# Patient Record
Sex: MALE | Race: WHITE | Employment: FULL TIME | URBAN - METROPOLITAN AREA
[De-identification: names, ages, dates, MRNs, and addresses within clinical notes are randomized per-mention and may not be internally consistent; named-entity substitution may affect disease eponyms.]

---

## 2018-03-19 ENCOUNTER — APPOINTMENT (OUTPATIENT)
Dept: PHYSICAL THERAPY | Age: 38
End: 2018-03-19
Payer: COMMERCIAL

## 2018-03-23 ENCOUNTER — HOSPITAL ENCOUNTER (OUTPATIENT)
Dept: PHYSICAL THERAPY | Age: 38
Discharge: HOME | End: 2018-03-23
Payer: COMMERCIAL

## 2018-03-23 PROCEDURE — 97161 PT EVAL LOW COMPLEX 20 MIN: CPT

## 2018-03-23 PROCEDURE — 97530 THERAPEUTIC ACTIVITIES: CPT

## 2018-03-23 PROCEDURE — 97110 THERAPEUTIC EXERCISES: CPT

## 2018-03-23 NOTE — PROGRESS NOTES
PHYSICAL THERAPY - DAILY TREATMENT NOTE    Patient Name: Christine Pompa        Date: 3/23/2018  : 1980   YES Patient  Verified  Visit #:   1   of   2  Insurance: Payor: [employer] / Plan: [employer] H[employer] / Product Type: HMO /      In time: 9:00 Out time: 10:00   Total Treatment Time: 60     Medicare Time Tracking (below)   Total Timed Codes (min):  n/a 1:1 Treatment Time:  n/a     TREATMENT AREA =  RA    SUBJECTIVE/HISTORY    Pain Level (on 0 to 10 scale):  0-1  / 10   Medication Changes/New allergies or changes in medical history, any new surgeries or procedures? YES    If yes, update Summary List   Subjective Functional Status/Changes:  []  No changes reported     CC:8months ago since dx'd with Serogenative RA. Had previously been very active. Running, Rowing. Has been doing strength training. Now feels oversensitive in ligaments. Ice/Rest ineffective, wants coping strategies to address inflammatory processes. Rowing 5-6x/week, Yoga 1/week. Strength training 2-3xweek. Front Squat/Row ([employer]), static core work, OHP. Full body circuit, 2-3' rest between circuits. Reports focus on DB work for all compound lifts. Hx L4-5 HNP. Reports 1 day of recovery, 6 days \"hard\". General Health:  Red Flags Indicated? [] Yes    [x] No  [] Yes [] No Recent weight change (If yes, due to dieting?  [] Yes  [] No)   [] Yes [] No Persistent cough  [] Yes [] No Unremitting pain at night  [] Yes [] No Dizziness  [] Yes [] No Blurred vision  [] Yes [] No Hands more cold or painful in cold weather  [] Yes [] No Ringing in ears  [] Yes [] No Difficulty swallowing  [] Yes [] No Dysfunction of bowel or bladder  [] Yes [] No Recent illness within past 3 weeks (i.e, cold, flu)  [] Yes [] No Jaw pain     Diagnostic Tests: [x] Lab work [x] X-rays    [] CT [] MRI     [] Other:  Results:seronegative Rheumatoid Arthritis     OBJECTIVE      25(25) min Therapeutic Exercise:  [x]  See flow sheet Rationale:      increase strength to improve the patients proximal joint stability     10(10) min Therapeutic Activity: Posture, positioning, tranfsers, hip hinge, squatting techniques   Rationale:    improve coordination, improve balance and increase proprioception to improve patient's safety in recreational activities. min Patient Education:  YES  Reviewed HEP   []  Progressed/Changed HEP based on: Other Objective/Functional Measures:    Discussed with patient at length his recreational activity, which is 6 days on/ 1 day recovery. Educated patient at length re: recovery days, as well as balancing his strength routine to incorporate posterior chain work and RTC work. .     Neurological (upper): Motor deficit: Left ER 4-/5  Reflexes: WNL  Sensory deficit: WNL  Dural signs:  WNL    Movement loss (Cervical) Mauricio Mod Min Nil Pain   Protrusion    x n/a   Flexion    x n/a   Retraction    x n/a   Extension    x n/an   Lateral Flexion R    x n/a   Lateral Flexion L    x n/a   Rotation R    x n/a   Rotation L    x n/a         Movement loss  (Lumbar) Mauricio Mod Min Nil Pain   Flexion     x   Extension     x   Side Gliding R     x   Side Gliding L     x          Hip: Alphia Hidden:  [] R    [] L    [] +    [x] -     Scour:  [] R    [] L    [] +    [x] -     Piriformis: [] R    [] L    [] +    [x] -          Deficits: Hamstrings 90/90[de-identified] [] R    [] L    [] +    [x] -     Tigre: [] R    [] L    [] +    [x] -         Gastrocsoleus (to neutral): Right: Left:      ROM:  [] Unable to assess at this time                                           AROM                                                              PROM   Left Right  Left Right   Flexion WNL WNL Flexion     Extension WNL WNL Extension     Scaption/ABD WNL WNL Scaption/ABD     ER @ 0 Degrees WNL WNL ER @ 0 Degrees     ER @ 90 Degrees WNL WNL ER @ 90 Degrees     IR @ 90 Degrees WNL WNL IR @ 90 Degrees     IR HBB WNL WNL IR HBB       End Feel / Painful Arc: none        Strength:   [] Unable to assess at this time                                                                            L (1-5) R (1-5) Pain   Flexors 5 5 [] Yes   [] No   Abductors 5 5 [] Yes   [] No   External Rotators 3+/4- 5 [] Yes   [] No   Internal Rotators 5 5 [] Yes   [] No   Supraspinatus 5 5 [] Yes   [] No   Serratus Anterior 5 5 [] Yes   [] No   Lower Trapezius 4 5 [] Yes   [] No   Elbow Flexion 5 5 [] Yes   [] No   Elbow Extension 5 5 [] Yes   [] No       Scapulohumoral Control / Rhythm:good    Accessory Motions: none    Able to eccentrically lower with good control? Optional Tests:    Sensation Left Right Reflexes Left Right   Biceps (C5)   Biceps (C5)     Vang Radial(C6-7)   Brachioradialis (C6)     Vang Ulnar(C8-T1)   Triceps (C7)       Crossover Imp.  [x] Pos   [] Neg  Yergason's Test [] Pos   [] Neg  Damaris's Test  [] Pos   [] Neg  Raheem's Sign [] Pos   [] Neg  Neer's Test  [] Pos   [] Neg  Clunk Test  [] Pos   [] Neg  Hawkin's Test  [x] Pos   [] Neg  AC Joint  [] Pos   [] Neg  Speed's Test  [] Pos   [] Neg SC Joint  [] Pos   [] Neg  Empty Can  [] Pos   [] Neg Pectoral Tightness [] Pos   [] Neg  Anterior Apprehension [] Pos   [] Neg   Posterior Apprehension [] Pos   [] Neg      ROM / Strength  [] Unable to assess                  AROM                      PROM                   Strength (1-5)    Left Right Left Right Left Right   Hip Flexion WNL WNL   4 4    Extension WNL WNL   4- 4-    Abduction WNL WNL   4- 4-    Adduction WNL WNL   5 5   Knee Flexion WNL WNL   5 5    Extension WNL WNL   5 5   Ankle Plantarflexion WNL WNL   5 5    Dorsiflexion WNL WNL   5 5   (+) Tigre Test bilaterally     Post Treatment Pain Level (on 0 to 10) scale:   0-1  / 10     ASSESSMENT    Assessment/Changes in Function:     See POC     []  See Progress Note/Recertification   Patient will continue to benefit from skilled PT services to  modify and progress therapeutic interventions, address functional mobility deficits, address strength deficits, analyze and address soft tissue restrictions and analyze and modify body mechanics/ergonomics to attain remaining goals. Progress toward goals / Updated goals:    See POC     PLAN     []  Upgrade activities as tolerated YES Continue plan of care   []  Discharge due to :    []  Other:      Therapist: Mell Kilgore \"MINERVA\" OSWALDO Haynes, Cert. MDT, Cert. DN, Cert. SMT    Date: 3/23/2018 Time: 9:09 AM   No future appointments.

## 2018-03-27 NOTE — PROGRESS NOTES
[address] [facility] PHYSICAL THERAPY  [address] [facility] [place]  [place], [address] - Phone: [phone]  Fax: [phone] / [address] [facility]  Patient Name: Michelle Alanis : 1980   Medical   Diagnosis: Generalized muscle weakness [M62.81]  Rheumatoid arthritis ([address] [M06.9] Treatment Diagnosis: Generalized Muscle Weakness, RA   Onset Date: Summer 2017     Referral Source: Shila Prieto MD [facility]): 3/27/2018   Prior Hospitalization: See medical history Provider #: [id number]   Prior Level of Function: Recreationally active    Comorbidities: Thyroid dysfunction    Medications: Verified on Patient Summary List   The Plan of Care and following information is based on the information from the initial evaluation.   ===========================================================================================  Assessment / key information: Patient is a 41 y.o. male presenting to clinic for generalized weakness due to RA. He reports performing recreational rowing 6x/week with various teams, strength training 3x/week, and yoga/recovery 1x/week. He reports intent to attend 1-2 PT sessions to \"get a general idea of exercises needed to help out\". Patient demonstrates poor proximal hip stability/strength, and Left shoulder girdle ER is notably weak with 3/5 MMT. He reports hx of L RTC problems, confirmed with (+) Crossover impingement and Ferrer-Cale tests. We discussed at length his current recreational activity routine, and the importance of recovery days. We also reviewed his exercises performed on his strength days, and provided him with therex to address his muscular imbalances. Patient performed and reviewed HEP to be performed over the next 2 weeks, with intent to return for further re-assessment and possible discharge. ===========================================================================================  Eval Complexity: History: MEDIUM  Complexity : 1-2 comorbidities / personal factors will impact the outcome/ POC Exam:HIGH Complexity : 4+ Standardized tests and measures addressing body structure, function, activity limitation and / or participation in recreation  Presentation: LOW Complexity : Stable, uncomplicated  Clinical Decision Making:LOW Complexity : FOTO score of 75-100Overall Complexity:LOW   Problem List: decrease strength   Treatment Plan may include any combination of the following: Therapeutic exercise, Therapeutic activities, Patient education and Functional mobility training  Patient / Family readiness to learn indicated by: asking questions, trying to perform skills and interest  Persons(s) to be included in education: patient (P)  Barriers to Learning/Limitations: None  Measures taken: n/a   Patient Goal (s): \"Learn some exercises to incorporate with what I'm doing\"   Patient self reported health status: excellent  Rehabilitation Potential: excellent   Short Term Goals: To be accomplished in  1  treatments:  1. Patient to be adherent to HEP to facilitate pain control with ADL's.  Long Term Goals: To be accomplished in  2  treatments:  1. Patient to be Safe and Independent with HEP to self-manage/prevent symptoms after DC. Frequency / Duration:   Patient to be seen  1  times per week for 1-2  treatments:  Patient / Caregiver education and instruction: activity modification and exercises. We reviewed our facility's Patient Personal Responsibilities (PPR) form, particularly in regards to compliance towards his appointment time, our attendance policy, and his home exercise program. Patient was informed of possible discharge for non-compliance to our attendance policy per PPR form. We also discussed his POC as deemed appropriate by the treating therapist and physician.  Patient verbalized understanding that he must show objective and functional improvement in an appropriate time frame. Patient verbalized understanding that should progress or compliance be lacking, we will contact the referring physician for further consultation to address and attempt to establish alternate treatment strategies as necessary and/or possibly discharge. G-Codes (GP): n/a  Therapist Signature: Sukhjinder Lopez \"BJ\" OSWALDO Haynes, Cert. MDT, Cert. DN, Cert. SMT Date: 6/52/5911   Certification Period: n/a Time: 11:11 AM   ===========================================================================================  I certify that the above Physical Therapy Services are being furnished while the patient is under my care. I agree with the treatment plan and certify that this therapy is necessary. Physician Signature:        Date:       Time:     Please sign and return to [employer] or you may fax the signed copy to [phone]. Thank you.

## 2018-04-13 ENCOUNTER — HOSPITAL ENCOUNTER (OUTPATIENT)
Dept: PHYSICAL THERAPY | Age: 38
Discharge: HOME | End: 2018-04-13
Payer: COMMERCIAL

## 2018-04-13 PROCEDURE — 97110 THERAPEUTIC EXERCISES: CPT

## 2018-04-13 PROCEDURE — 97530 THERAPEUTIC ACTIVITIES: CPT

## 2018-04-13 NOTE — PROGRESS NOTES
PHYSICAL THERAPY - DAILY TREATMENT NOTE    Patient Name: Harshil Simpson        Date: 2018  : 1980   YES Patient  Verified  Visit #:   2   of   2  Insurance: Payor: [employer] / Plan: [employer] HMO / Product Type: HMO /      In time: 8:30 Out time: 9:20   Total Treatment Time: 50     TREATMENT AREA = Generalized muscle weakness [M62.81]  Rheumatoid arthritis ([address] [M06.9]    SUBJECTIVE    Pain Level (on 0 to 10 scale):  0  / 10   Medication Changes/New allergies or changes in medical history, any new surgeries or procedures? NO    If yes, update Summary List   Subjective Functional Status/Changes:  []  No changes reported     \"Exercises have been going great. Definitely getting stronger. I had strained my back doing yard work after I saw you but the original exercises you gave me actually helped a lot \"          OBJECTIVE  Therapeutic Procedures:  Min Procedure Specifics + Rationale   n/a [x]  Patient Education (performed throughout session) [x] Review HEP    [] Progressed/Changed HEP based on:   [] proper performance and advancement of Therex/TA   [] reduction in pain level    [] increased functional capacity       [] change in directional preference   42 [x] Therapeutic Exercise   [x]  See Flowsheet   Rationale: increase ROM and increase strength to improve the patients ability to participate in ADL's    8 [x] Therapeutic Activity   [x]  See Flowsheet; Re-assessment  Rationale:  To improve safety, proprioception, coordination, and efficiency with tasks     Other Objective/Functional Measures:    See DC     Post Treatment Pain Level (on 0 to 10) scale:   0  / 10     ASSESSMENT    Assessment/Changes in Function:      []  See Progress Note/ Recertification  [x]  See Discharge Summary        Patient will continue to benefit from skilled PT services to modify and progress therapeutic interventions, address functional mobility deficits, address ROM deficits, address strength deficits, analyze and address soft tissue restrictions, analyze and cue movement patterns, analyze and modify body mechanics/ergonomics and instruct in home and community integration  to attain remaining goals   Progress toward goals / Updated goals:    See DC     PLAN    [x]  Upgrade activities as tolerated  [x]  Update interventions per flow sheet NO Continue plan of care   []  Discharge due to :    []  Other:      Therapist: Darrell Ram \"MINERVA\" OSWALDO Haynes, Cert. MDT, CertVinicio DN, Cert. SMT    Date: 4/13/2018 Time: 8:34 AM   No future appointments.

## 2018-04-16 NOTE — PROGRESS NOTES
[address] [facility] PHYSICAL THERAPY  [address] [facility] [place], [address] - Phone: [phone]  Fax: [phone]  DISCHARGE SUMMARY  Patient Name: Vaughn Elizabeth : 1980   Treatment/Medical Diagnosis: Generalized muscle weakness [M62.81]  Rheumatoid arthritis [facility]) [M06.9]   Referral Source: Angel Melendrez MD     Date of Initial Visit: 3/27/18 Attended Visits: 2 Missed Visits: 0     SUMMARY OF TREATMENT  Patient's POC has consisted of therex, therapeutic activities, manual therapy prn, modalities prn, pt. education, and a comprehensive HEP. Treatment strategies used to address functional mobility deficits, ROM deficits, strength deficits, analyze and address soft tissue restrictions, analyze and cue movement patterns, analyze and modify body mechanics/ergonomics, assess and modify postural abnormalities and instruct in home and community integration. CURRENT STATUS  Patient reports full compliance with his HEP established and initial evaluation. He progressed in advancing his HEP on his follow up sessions, further demonstrating variations in lack of core stability and strength we addressed. Patient reports he had strained his T/S between sessions, which was resolved via initial HEP given. GOALS/MEASURE OF PROGRESS Goal Met? · Short Term Goals: To be accomplished in  1  treatments:  1. Patient to be adherent to HEP to facilitate pain control with ADL's.     · Long Term Goals: To be accomplished in  2  treatments:  1. Patient to be Safe and Independent with HEP to self-manage/prevent symptoms after DC. MET            MET       G-Codes: n/a  RECOMMENDATIONS  Discontinue therapy. Progressing towards or have reached established goals. If you have any questions/comments please contact us directly at [phone]. Thank you for allowing us to assist in the care of your patient. Therapist Signature: Christina Ramirez \"MINERVA\" FRANCES ChapinT, Cert. MDT, Cert. DN, Cert. SMT Date: 4/13/18   Reporting Period: n/a     Certification Period n/a Time: 4:03 PM     NOTE TO PHYSICIAN:  PLEASE COMPLETE THE ORDERS BELOW AND FAX TO   [facility]: [phone]  If you are unable to process this request in 24 hours please contact our office: [phone]    ___ I have read the above report and request that my patient continue as recommended.   ___ I have read the above report and request that my patient continue therapy with the following changes/special instructions:_________________________________________________________   ___ I have read the above report and request that my patient be discharged from therapy.      Physician Signature:        Date:     Time: